# Patient Record
Sex: MALE | Employment: OTHER
[De-identification: names, ages, dates, MRNs, and addresses within clinical notes are randomized per-mention and may not be internally consistent; named-entity substitution may affect disease eponyms.]

---

## 2022-06-29 ENCOUNTER — OFFICE VISIT (OUTPATIENT)
Dept: FAMILY MEDICINE CLINIC | Facility: CLINIC | Age: 37
End: 2022-06-29
Payer: OTHER GOVERNMENT

## 2022-06-29 VITALS
HEART RATE: 61 BPM | DIASTOLIC BLOOD PRESSURE: 72 MMHG | HEIGHT: 72 IN | WEIGHT: 193 LBS | BODY MASS INDEX: 26.14 KG/M2 | SYSTOLIC BLOOD PRESSURE: 128 MMHG | TEMPERATURE: 98.5 F | OXYGEN SATURATION: 97 %

## 2022-06-29 DIAGNOSIS — A63.0 GENITAL WARTS: ICD-10-CM

## 2022-06-29 DIAGNOSIS — D17.23 LIPOMA OF RIGHT LOWER EXTREMITY: Primary | ICD-10-CM

## 2022-06-29 PROCEDURE — 99203 OFFICE O/P NEW LOW 30 MIN: CPT | Performed by: FAMILY MEDICINE

## 2022-06-29 RX ORDER — DOXYCYCLINE HYCLATE 100 MG
TABLET ORAL
COMMUNITY
Start: 2022-05-31 | End: 2022-07-20

## 2022-06-29 RX ORDER — BETAMETHASONE DIPROPIONATE 0.5 MG/G
CREAM TOPICAL
COMMUNITY
Start: 2022-06-28 | End: 2022-08-02

## 2022-06-29 RX ORDER — IMIQUIMOD 12.5 MG/.25G
CREAM TOPICAL
COMMUNITY
Start: 2022-05-31 | End: 2022-07-20

## 2022-06-29 ASSESSMENT — ENCOUNTER SYMPTOMS
RESPIRATORY NEGATIVE: 1
ALLERGIC/IMMUNOLOGIC NEGATIVE: 1
GASTROINTESTINAL NEGATIVE: 1
EYES NEGATIVE: 1

## 2022-06-29 ASSESSMENT — PATIENT HEALTH QUESTIONNAIRE - PHQ9
SUM OF ALL RESPONSES TO PHQ QUESTIONS 1-9: 0
SUM OF ALL RESPONSES TO PHQ9 QUESTIONS 1 & 2: 0
SUM OF ALL RESPONSES TO PHQ QUESTIONS 1-9: 0
2. FEELING DOWN, DEPRESSED OR HOPELESS: 0
SUM OF ALL RESPONSES TO PHQ QUESTIONS 1-9: 0
1. LITTLE INTEREST OR PLEASURE IN DOING THINGS: 0
SUM OF ALL RESPONSES TO PHQ QUESTIONS 1-9: 0

## 2022-06-29 NOTE — PROGRESS NOTES
Maritza Perez (: 1985) is a 40 y.o. male, new patient, here for evaluation of the following chief complaint(s): Other (test for std had mile on penis. went to the dermatologist and they did a biopsy. ) and Other (growth on the right thigh )       ASSESSMENT/PLAN:  1. Lipoma of right lower extremity  2. Genital warts    Establishing care,  Likely lipoma in the right lower extremity, declined surgical work-up or management at this time, small, 1 to 2 cm large,  History of genital warts, treated by dermatology, reassured patient, let him know there is no HPV testing for males in this country except for active tumors, wife should continue to have cervical cancer screenings as indicated,      Return in about 1 month (around 2022) for physical.    SUBJECTIVE/OBJECTIVE:  HPI  Establishing care, works as a  in [place], moved here recently, has 2 daughters,  No recent primary care, is [employer]     History of a genital wart, removed by dermatology, has steroid, betamethasone cream that was used on the wound created by the treatment for the wart, patient had concerns regarding HPV, would like to be tested, worried that he will get something to his wife. Growth underskin, mobile, nontender, not fluctuant, likely lipoma, has grown slightly right thigh,      PMH   has a past medical history of Traumatic brain injury ([facility] [address] Past Surgical History:   Procedure Laterality Date    INGUINAL HERNIA REPAIR      KIDNEY STONE REMOVAL      surgical removal of kidney stones and lithotripsy.  OTHER SURGICAL HISTORY      bladder surgery when he was young     OTHER SURGICAL HISTORY  2004    broke metacarpal on the right hand.   Had hardware that was placed, but later removed       Current Outpatient Medications on File Prior to Visit   Medication Sig Dispense Refill    betamethasone dipropionate 0.05 % cream       doxycycline hyclate (VIBRA-TABS) 100 MG tablet (Patient not taking: Reported on 6/29/2022)      imiquimod (ALDARA) 5 % cream  (Patient not taking: Reported on 6/29/2022)       No current facility-administered medications on file prior to visit. Social History     Socioeconomic History    Marital status: Unknown     Spouse name: Not on file    Number of children: Not on file    Years of education: Not on file    Highest education level: Not on file   Occupational History    Not on file   Tobacco Use    Smoking status: Current Some Day Smoker    Smokeless tobacco: Never Used    Tobacco comment: cigars 3 times a week    Vaping Use    Vaping Use: Never used   Substance and Sexual Activity    Alcohol use: Yes     Comment: one beer with dinner    Drug use: Never    Sexual activity: Yes     Partners: Female   Other Topics Concern    Not on file   Social History Narrative    Not on file     Social Determinants of Health     Financial Resource Strain:     Difficulty of Paying Living Expenses: Not on file   Food Insecurity:     Worried About Running Out of Food in the Last Year: Not on file    Rafael of Food in the Last Year: Not on file   Transportation Needs:     Lack of Transportation (Medical): Not on file    Lack of Transportation (Non-Medical):  Not on file   Physical Activity:     Days of Exercise per Week: Not on file    Minutes of Exercise per Session: Not on file   Stress:     Feeling of Stress : Not on file   Social Connections:     Frequency of Communication with Friends and Family: Not on file    Frequency of Social Gatherings with Friends and Family: Not on file    Attends [employer] Services: Not on file    Active Member of Clubs or Organizations: Not on file    Attends Club or Organization Meetings: Not on file    Marital Status: Not on file   Intimate Partner Violence:     Fear of Current or Ex-Partner: Not on file    Emotionally Abused: Not on file    Physically Abused: Not on file    Sexually Abused: Not on file Housing Stability:     Unable to Pay for Housing in the Last Year: Not on file    Number of Places Lived in the Last Year: Not on file    Unstable Housing in the Last Year: Not on file       Family History   Problem Relation Age of Onset    Cancer Mother         skin cancer    No Known Problems Father     No Known Problems Sister     No Known Problems Maternal Grandfather     Dementia Paternal Grandmother     Heart Attack Paternal Grandfather          ALLERGIES  No Known Allergies    PREVIOUS PRIMARY CARE PROVIDER  Yang Perrin MD      RECORDS    Provided by patient: None    Review of Systems   Constitutional: Negative. HENT: Negative. Eyes: Negative. Respiratory: Negative. Cardiovascular: Negative. Gastrointestinal: Negative. Endocrine: Negative. Genitourinary: Negative. Musculoskeletal: Negative. Skin: Negative. Allergic/Immunologic: Negative. Neurological: Negative. Hematological: Negative. Psychiatric/Behavioral: Negative. All other systems reviewed and are negative. Physical Exam  Constitutional:       General: He is not in acute distress. Appearance: He is not ill-appearing, toxic-appearing or diaphoretic. HENT:      Head: Normocephalic and atraumatic. Right Ear: External ear normal. There is no impacted cerumen. Left Ear: External ear normal. There is no impacted cerumen. Nose: Nose normal. No congestion or rhinorrhea. Mouth/Throat:      Mouth: Mucous membranes are moist.      Pharynx: No oropharyngeal exudate or posterior oropharyngeal erythema. Eyes:      General:         Right eye: No discharge. Left eye: No discharge. Extraocular Movements: Extraocular movements intact. Conjunctiva/sclera: Conjunctivae normal.      Pupils: Pupils are equal, round, and reactive to light. Cardiovascular:      Rate and Rhythm: Normal rate. Pulses: Normal pulses.    Pulmonary:      Effort: Pulmonary effort is normal. No respiratory distress. Abdominal:      General: Abdomen is flat. Palpations: Abdomen is soft. Musculoskeletal:      Cervical back: Normal range of motion and neck supple. No rigidity. Neurological:      General: No focal deficit present. Mental Status: He is alert and oriented to person, place, and time. Mental status is at baseline. Cranial Nerves: No cranial nerve deficit. Sensory: No sensory deficit. Motor: No weakness. Coordination: Coordination normal.      Gait: Gait normal.      Deep Tendon Reflexes: Reflexes normal.   Psychiatric:         Mood and Affect: Mood normal.         On this date 06/29/22  I have spent 30 minutes reviewing previous notes, test results and face to face with the patient discussing the diagnosis and importance of compliance with the treatment plan as well as documenting on the day of the visit. No LOS data to display        An electronic signature was used to authenticate this note.   -- Horacio Nicholson MD

## 2022-08-02 ENCOUNTER — OFFICE VISIT (OUTPATIENT)
Dept: FAMILY MEDICINE CLINIC | Facility: CLINIC | Age: 37
End: 2022-08-02
Payer: OTHER GOVERNMENT

## 2022-08-02 VITALS
DIASTOLIC BLOOD PRESSURE: 72 MMHG | SYSTOLIC BLOOD PRESSURE: 116 MMHG | BODY MASS INDEX: 26.55 KG/M2 | OXYGEN SATURATION: 98 % | HEIGHT: 72 IN | HEART RATE: 64 BPM | WEIGHT: 196 LBS | TEMPERATURE: 97.8 F

## 2022-08-02 DIAGNOSIS — D17.23 LIPOMA OF RIGHT LOWER EXTREMITY: Primary | ICD-10-CM

## 2022-08-02 DIAGNOSIS — Z00.00 WELL ADULT EXAM: ICD-10-CM

## 2022-08-02 LAB
BASOPHILS # BLD: 0.1 K/UL (ref 0–0.2)
BASOPHILS NFR BLD: 1 % (ref 0–2)
DIFFERENTIAL METHOD BLD: ABNORMAL
EOSINOPHIL # BLD: 0.3 K/UL (ref 0–0.8)
EOSINOPHIL NFR BLD: 5 % (ref 0.5–7.8)
ERYTHROCYTE [DISTWIDTH] IN BLOOD BY AUTOMATED COUNT: 13.1 % (ref 11.9–14.6)
HCT VFR BLD AUTO: 50.1 % (ref 41.1–50.3)
HGB BLD-MCNC: 16.5 G/DL (ref 13.6–17.2)
IMM GRANULOCYTES # BLD AUTO: 0 K/UL (ref 0–0.5)
IMM GRANULOCYTES NFR BLD AUTO: 0 % (ref 0–5)
LYMPHOCYTES # BLD: 1.8 K/UL (ref 0.5–4.6)
LYMPHOCYTES NFR BLD: 29 % (ref 13–44)
MCH RBC QN AUTO: 33.2 PG (ref 26.1–32.9)
MCHC RBC AUTO-ENTMCNC: 32.9 G/DL (ref 31.4–35)
MCV RBC AUTO: 100.8 FL (ref 79.6–97.8)
MONOCYTES # BLD: 0.5 K/UL (ref 0.1–1.3)
MONOCYTES NFR BLD: 8 % (ref 4–12)
NEUTS SEG # BLD: 3.5 K/UL (ref 1.7–8.2)
NEUTS SEG NFR BLD: 57 % (ref 43–78)
NRBC # BLD: 0 K/UL (ref 0–0.2)
PLATELET # BLD AUTO: 204 K/UL (ref 150–450)
PMV BLD AUTO: 10.8 FL (ref 9.4–12.3)
RBC # BLD AUTO: 4.97 M/UL (ref 4.23–5.6)
WBC # BLD AUTO: 6 K/UL (ref 4.3–11.1)

## 2022-08-02 PROCEDURE — 99395 PREV VISIT EST AGE 18-39: CPT | Performed by: FAMILY MEDICINE

## 2022-08-02 RX ORDER — SILDENAFIL CITRATE 20 MG/1
TABLET ORAL
COMMUNITY
Start: 2022-07-11 | End: 2022-08-02

## 2022-08-02 RX ORDER — IMIQUIMOD 12.5 MG/.25G
CREAM TOPICAL
COMMUNITY
Start: 2022-04-20 | End: 2022-08-02

## 2022-08-02 RX ORDER — CEPHALEXIN 500 MG/1
CAPSULE ORAL
COMMUNITY
Start: 2022-04-14 | End: 2022-08-02

## 2022-08-02 RX ORDER — DOXYCYCLINE HYCLATE 100 MG
TABLET ORAL
COMMUNITY
Start: 2022-05-31 | End: 2022-08-02

## 2022-08-02 ASSESSMENT — ENCOUNTER SYMPTOMS
GASTROINTESTINAL NEGATIVE: 1
RESPIRATORY NEGATIVE: 1
EYES NEGATIVE: 1
ALLERGIC/IMMUNOLOGIC NEGATIVE: 1

## 2022-08-02 ASSESSMENT — PATIENT HEALTH QUESTIONNAIRE - PHQ9
SUM OF ALL RESPONSES TO PHQ QUESTIONS 1-9: 0
SUM OF ALL RESPONSES TO PHQ QUESTIONS 1-9: 0
1. LITTLE INTEREST OR PLEASURE IN DOING THINGS: 0
SUM OF ALL RESPONSES TO PHQ QUESTIONS 1-9: 0
SUM OF ALL RESPONSES TO PHQ QUESTIONS 1-9: 0
2. FEELING DOWN, DEPRESSED OR HOPELESS: 0
SUM OF ALL RESPONSES TO PHQ9 QUESTIONS 1 & 2: 0

## 2022-08-02 NOTE — PROGRESS NOTES
HPI  Nohemi Arteaga is a 40 y.o. male who presents in the office today for wellness visit. Reviewed immunizations which patient is due for with patient. Medical problems discussed in addition to physical:  Drinks a beer 3 times a week, no smoking. Concerned regarding lipoma, likely lipoma on the right thigh, been told by 4 different providers that it was nothing, patient would like to see a surgeon, specialist to evaluate, remove it. Current Outpatient Medications on File Prior to Visit   Medication Sig Dispense Refill    cephALEXin (KEFLEX) 500 MG capsule  (Patient not taking: Reported on 8/2/2022)      doxycycline hyclate (VIBRA-TABS) 100 MG tablet  (Patient not taking: Reported on 8/2/2022)      imiquimod (ALDARA) 5 % cream  (Patient not taking: Reported on 8/2/2022)      sildenafil (REVATIO) 20 MG tablet Take 3 tablets by mouth one hour prior to sex as needed. (Patient not taking: Reported on 8/2/2022)      betamethasone dipropionate 0.05 % cream  (Patient not taking: Reported on 8/2/2022)       No current facility-administered medications on file prior to visit. Past Medical History:   Diagnosis Date    Traumatic brain injury ([address]        No Known Allergies    Past Surgical History:   Procedure Laterality Date    INGUINAL HERNIA REPAIR  2016    KIDNEY STONE REMOVAL  2016    surgical removal of kidney stones and lithotripsy. OTHER SURGICAL HISTORY      bladder surgery when he was young     OTHER SURGICAL HISTORY  06/2004    broke metacarpal on the right hand.   Had hardware that was placed, but later removed       Social History     Socioeconomic History    Marital status: Unknown     Spouse name: Not on file    Number of children: Not on file    Years of education: Not on file    Highest education level: Not on file   Occupational History    Not on file   Tobacco Use    Smoking status: Some Days    Smokeless tobacco: Never    Tobacco comments:     cigars 3 times a week    Vaping Use Vaping Use: Never used   Substance and Sexual Activity    Alcohol use: Yes     Comment: one beer with dinner    Drug use: Never    Sexual activity: Yes     Partners: Female   Other Topics Concern    Not on file   Social History Narrative    Not on file     Social Determinants of Health     Financial Resource Strain: Not on file   Food Insecurity: Not on file   Transportation Needs: Not on file   Physical Activity: Not on file   Stress: Not on file   Social Connections: Not on file   Intimate Partner Violence: Not on file   Housing Stability: Not on file             Review of Systems:  Review of Systems   Constitutional: Negative. HENT: Negative. Eyes: Negative. Respiratory: Negative. Cardiovascular: Negative. Gastrointestinal: Negative. Endocrine: Negative. Genitourinary: Negative. Musculoskeletal: Negative. Skin: Negative. Allergic/Immunologic: Negative. Neurological: Negative. Hematological: Negative. Psychiatric/Behavioral: Negative. All other systems reviewed and are negative. PHYSICAL EXAM   /72 (Site: Left Upper Arm, Position: Sitting, Cuff Size: Medium Adult)   Pulse 64   Temp 97.8 °F (36.6 °C) (Oral)   Ht 5' 11.5\" (1.816 m)   Wt 196 lb (88.9 kg)   SpO2 98%   BMI 26.96 kg/m²        Physical Exam   Physical Exam  Constitutional:       General: He is not in acute distress. Appearance: He is not ill-appearing, toxic-appearing or diaphoretic. HENT:      Head: Normocephalic and atraumatic. Right Ear: External ear normal. There is no impacted cerumen. Left Ear: External ear normal. There is no impacted cerumen. Nose: Nose normal. No congestion or rhinorrhea. Mouth/Throat:      Mouth: Mucous membranes are moist.      Pharynx: No oropharyngeal exudate or posterior oropharyngeal erythema. Eyes:      General:         Right eye: No discharge. Left eye: No discharge.       Extraocular Movements: Extraocular movements intact. Conjunctiva/sclera: Conjunctivae normal.      Pupils: Pupils are equal, round, and reactive to light. Cardiovascular:      Rate and Rhythm: Normal rate. Pulses: Normal pulses. Pulmonary:      Effort: Pulmonary effort is normal. No respiratory distress. Abdominal:      General: Abdomen is flat. Palpations: Abdomen is soft. Musculoskeletal:      Cervical back: Normal range of motion and neck supple. No rigidity. Neurological:      General: No focal deficit present. Mental Status: He is alert and oriented to person, place, and time. Mental status is at baseline. Cranial Nerves: No cranial nerve deficit. Sensory: No sensory deficit. Motor: No weakness. Coordination: Coordination normal.      Gait: Gait normal.      Deep Tendon Reflexes: Reflexes normal.   Psychiatric:         Mood and Affect: Mood normal.           No results found for this visit on 08/02/22. DIAGNOSIS   Diagnosis Orders   1. Lipoma of right lower extremity  TSH    CBC with Auto Differential    Comprehensive Metabolic Panel    Lipid Panel    Vitamin D 25 Hydroxy    HIV 1/2 Ag/Ab, 4TH Generation,W Rflx Confirm    Hepatitis C Antibody    [facility]    Hepatitis C Antibody    HIV 1/2 Ag/Ab, 4TH Generation,W Rflx Confirm    Vitamin D 25 Hydroxy    Lipid Panel    Comprehensive Metabolic Panel    CBC with Auto Differential    TSH      2.  Well adult exam  TSH    CBC with Auto Differential    Comprehensive Metabolic Panel    Lipid Panel    Vitamin D 25 Hydroxy    HIV 1/2 Ag/Ab, 4TH Generation,W Rflx Confirm    Hepatitis C Antibody    [facility]    Hepatitis C Antibody    HIV 1/2 Ag/Ab, 4TH Generation,W Rflx Confirm    Vitamin D 25 Hydroxy    Lipid Panel    Comprehensive Metabolic Panel    CBC with Auto Differential    TSH            ASSESSMENT AND PLAN    he was encouraged to sign up/go to [employer] for access to information at a later time. Continue diet, exercise, meds as noted. Likely lipoma right thigh, no growth, will refer to general surgery for evaluation, opinion  Check labs today,  Return for physical in 1 year    MDM          Routine Health and Prevention:   Medications were reconciled at today's visit and his health maintenance items were reviewed & updated as well. Recommended exercise and balanced diet per [facility] recommendations.      Orders Placed This Encounter   Procedures    TSH     Standing Status:   Future     Number of Occurrences:   1     Standing Expiration Date:   8/2/2023    CBC with Auto Differential     Standing Status:   Future     Number of Occurrences:   1     Standing Expiration Date:   8/2/2023    Comprehensive Metabolic Panel     Standing Status:   Future     Number of Occurrences:   1     Standing Expiration Date:   8/2/2023    Lipid Panel     Standing Status:   Future     Number of Occurrences:   1     Standing Expiration Date:   8/2/2023    Vitamin D 25 Hydroxy     Standing Status:   Future     Number of Occurrences:   1     Standing Expiration Date:   8/2/2023    HIV 1/2 Ag/Ab, 4TH Generation,W Rflx Confirm     Standing Status:   Future     Number of Occurrences:   1     Standing Expiration Date:   8/2/2023    Hepatitis C Antibody     Standing Status:   Future     Number of Occurrences:   1     Standing Expiration Date:   8/2/2023    [facility] [address] [facility], [facility]     Referral Priority:   Routine     Referral Type:   Eval and Treat     Referral Reason:   Specialty Services Required     Requested Specialty:   General Surgery     Number of Visits Requested:   1          Luma Laurent MD

## 2022-08-03 ENCOUNTER — TELEPHONE (OUTPATIENT)
Dept: FAMILY MEDICINE CLINIC | Facility: CLINIC | Age: 37
End: 2022-08-03

## 2022-08-03 LAB
25(OH)D3 SERPL-MCNC: 34 NG/ML (ref 30–100)
ALBUMIN SERPL-MCNC: 4.2 G/DL (ref 3.5–5)
ALBUMIN/GLOB SERPL: 1.4 {RATIO} (ref 1.2–3.5)
ALP SERPL-CCNC: 55 U/L (ref 50–136)
ALT SERPL-CCNC: 33 U/L (ref 12–65)
ANION GAP SERPL CALC-SCNC: 7 MMOL/L (ref 7–16)
AST SERPL-CCNC: 18 U/L (ref 15–37)
BILIRUB SERPL-MCNC: 0.4 MG/DL (ref 0.2–1.1)
BUN SERPL-MCNC: 16 MG/DL (ref 6–23)
CALCIUM SERPL-MCNC: 9.2 MG/DL (ref 8.3–10.4)
CHLORIDE SERPL-SCNC: 110 MMOL/L (ref 98–107)
CHOLEST SERPL-MCNC: 161 MG/DL
CO2 SERPL-SCNC: 24 MMOL/L (ref 21–32)
CREAT SERPL-MCNC: 1.2 MG/DL (ref 0.8–1.5)
GLOBULIN SER CALC-MCNC: 2.9 G/DL (ref 2.3–3.5)
GLUCOSE SERPL-MCNC: 91 MG/DL (ref 65–100)
HCV AB SER QL: NONREACTIVE
HDLC SERPL-MCNC: 57 MG/DL (ref 40–60)
HDLC SERPL: 2.8 {RATIO}
HIV 1+2 AB+HIV1 P24 AG SERPL QL IA: NONREACTIVE
HIV 1/2 RESULT COMMENT: NORMAL
LDLC SERPL CALC-MCNC: 88.2 MG/DL
POTASSIUM SERPL-SCNC: 4.6 MMOL/L (ref 3.5–5.1)
PROT SERPL-MCNC: 7.1 G/DL (ref 6.3–8.2)
SODIUM SERPL-SCNC: 141 MMOL/L (ref 136–145)
TRIGL SERPL-MCNC: 79 MG/DL (ref 35–150)
TSH, 3RD GENERATION: 0.75 UIU/ML (ref 0.36–3.74)
VLDLC SERPL CALC-MCNC: 15.8 MG/DL (ref 6–23)

## 2022-10-27 ENCOUNTER — OFFICE VISIT (OUTPATIENT)
Dept: SURGERY | Age: 37
End: 2022-10-27
Payer: OTHER GOVERNMENT

## 2022-10-27 VITALS
DIASTOLIC BLOOD PRESSURE: 70 MMHG | WEIGHT: 193.6 LBS | HEART RATE: 67 BPM | SYSTOLIC BLOOD PRESSURE: 120 MMHG | HEIGHT: 71 IN | OXYGEN SATURATION: 97 % | BODY MASS INDEX: 27.1 KG/M2

## 2022-10-27 DIAGNOSIS — R22.41 SUBCUTANEOUS MASS OF RIGHT LOWER EXTREMITY: ICD-10-CM

## 2022-10-27 PROCEDURE — 99202 OFFICE O/P NEW SF 15 MIN: CPT | Performed by: SURGERY

## 2022-10-27 NOTE — PROGRESS NOTES
[facility]  [address] [place] [address]  [phone]    Office Note/H&P/Consult Note   Sandra Valles   MRN: [id number]     : 1985        HPI: Sandra Valles is a 40 y.o. male who is here to see me today with a subcutaneous nodule of his right thigh. Is been present for quite some time. At times it enlarges especially when he is working out. At times it can be uncomfortable. He would like to have it evaluated and would like to have it removed. Past Medical History:   Diagnosis Date    Traumatic brain injury      Past Surgical History:   Procedure Laterality Date    INGUINAL HERNIA REPAIR  2016    KIDNEY STONE REMOVAL  2016    surgical removal of kidney stones and lithotripsy. OTHER SURGICAL HISTORY      bladder surgery when he was young     OTHER SURGICAL HISTORY  2004    broke metacarpal on the right hand. Had hardware that was placed, but later removed     No current outpatient medications on file. No current facility-administered medications for this visit. ALLERGIES:  Patient has no known allergies.     Social History     Socioeconomic History    Marital status: Unknown     Spouse name: None    Number of children: None    Years of education: None    Highest education level: None   Tobacco Use    Smoking status: Some Days    Smokeless tobacco: Never    Tobacco comments:     cigars 3 times a week    Vaping Use    Vaping Use: Never used   Substance and Sexual Activity    Alcohol use: Yes     Comment: one beer with dinner    Drug use: Never    Sexual activity: Yes     Partners: Female     Social History     Tobacco Use   Smoking Status Some Days   Smokeless Tobacco Never   Tobacco Comments    cigars 3 times a week      Family History   Problem Relation Age of Onset    Cancer Mother         skin cancer    No Known Problems Father     No Known Problems Sister     No Known Problems Maternal Grandfather     Dementia Paternal Grandmother Heart Attack Paternal Grandfather        ROS: The patient has no difficulty with chest pain or shortness of breath. No fever or chills. Comprehensive review of systems was otherwise unremarkable except as noted above. Physical Exam:   Constitutional: Alert oriented cooperative patient in no acute distress. /70   Pulse 67   Ht 5' 11\" (1.803 m)   Wt 193 lb 9.6 oz (87.8 kg)   SpO2 97%   BMI 27.00 kg/m²   Eyes:Sclera are clear, pupils symmetric   ENMT: ears have no obvious external lesions; no obvious neck masses; no lip lesions  CV: RRR. Normal perfusion; no embolic signs  Resp: No JVD. Breathing is  non-labored. No audible wheezing   GI: soft and non-distended     Musculoskeletal: no significant asymmetry; normal tone; unremarkable with normal function. 1 x 1 cm subcutaneous nodule of the right thigh. Neuro:  No obvious focal deficits; moves all 4; A&O x3  Psychiatric: normal affect and mood, no memory impairment      Labs:  Lab Results   Component Value Date/Time    WBC 6.0 08/02/2022 08:35 AM    HGB 16.5 08/02/2022 08:35 AM     08/02/2022 08:35 AM     08/02/2022 08:35 AM    K 4.6 08/02/2022 08:35 AM     08/02/2022 08:35 AM    CO2 24 08/02/2022 08:35 AM    BUN 16 08/02/2022 08:35 AM    ALT 33 08/02/2022 08:35 AM       No results found for this or any previous visit. I reviewed patient's medications, labs, and independently reviewed relevant radiologic studies if available. Labs/radiology studies as ordered in [facility] care or in scanned in media tab if pt is pre-op. Amt of data reviewed moderate-extensive. I spent 15 minutes with him today. Greater than 50 so this time was spent in counseling. Assessment/Plan:     )Kristy Lawler is a 40 y.o. male who has signs and symptoms consistent with the below issues:  Right thigh subcutaneous mass. We can easily remove this in the office. He will need approval from [employer].   We will make an appointment to see him in the next week or so to have this removed in the office if his insurance approves.           Waldo oMore MD,  FACS

## 2024-06-21 ENCOUNTER — APPOINTMENT (OUTPATIENT)
Dept: CT IMAGING | Age: 39
End: 2024-06-21
Payer: OTHER MISCELLANEOUS

## 2024-06-21 ENCOUNTER — APPOINTMENT (OUTPATIENT)
Dept: GENERAL RADIOLOGY | Age: 39
End: 2024-06-21
Payer: OTHER MISCELLANEOUS

## 2024-06-21 ENCOUNTER — HOSPITAL ENCOUNTER (EMERGENCY)
Age: 39
Discharge: HOME | End: 2024-06-22
Attending: EMERGENCY MEDICINE
Payer: OTHER MISCELLANEOUS

## 2024-06-21 VITALS
BODY MASS INDEX: 26.41 KG/M2 | OXYGEN SATURATION: 99 % | DIASTOLIC BLOOD PRESSURE: 80 MMHG | HEART RATE: 73 BPM | TEMPERATURE: 97.6 F | SYSTOLIC BLOOD PRESSURE: 121 MMHG | HEIGHT: 72 IN | RESPIRATION RATE: 15 BRPM | WEIGHT: 195 LBS
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
## 2024-06-21 DIAGNOSIS — S39.012A STRAIN OF LUMBAR REGION, INITIAL ENCOUNTER: ICD-10-CM

## 2024-06-21 LAB
BILIRUB UR QL: ABNORMAL
GLUCOSE UR QL STRIP.AUTO: NEGATIVE MG/DL
KETONES UR-MCNC: NEGATIVE MG/DL
LEUKOCYTE ESTERASE UR QL STRIP: NEGATIVE
NITRITE UR QL: NEGATIVE
PH UR: 5.5 (ref 5–9)
PROT UR QL: NEGATIVE MG/DL
RBC # UR STRIP: NEGATIVE
SERVICE CMNT-IMP: ABNORMAL
SP GR UR: >1.03 (ref 1–1.02)
UROBILINOGEN UR QL: 0.2 EU/DL (ref 0.2–1)

## 2024-06-21 PROCEDURE — 81003 URINALYSIS AUTO W/O SCOPE: CPT

## 2024-06-21 PROCEDURE — 72100 X-RAY EXAM L-S SPINE 2/3 VWS: CPT

## 2024-06-21 PROCEDURE — 72131 CT LUMBAR SPINE W/O DYE: CPT

## 2024-06-21 PROCEDURE — 99284 EMERGENCY DEPT VISIT MOD MDM: CPT

## 2024-06-21 ASSESSMENT — PAIN - FUNCTIONAL ASSESSMENT: PAIN_FUNCTIONAL_ASSESSMENT: 0-10

## 2024-06-21 ASSESSMENT — ENCOUNTER SYMPTOMS: BACK PAIN: 1

## 2024-06-21 ASSESSMENT — PAIN SCALES - GENERAL: PAINLEVEL_OUTOF10: 7

## 2024-06-22 RX ORDER — LIDOCAINE 50 MG/G
1 PATCH TOPICAL DAILY
Qty: 10 PATCH | Refills: 0 | Status: SHIPPED | OUTPATIENT
Start: 2024-06-22 | End: 2024-07-02

## 2024-06-22 RX ORDER — CYCLOBENZAPRINE HCL 5 MG
5 TABLET ORAL NIGHTLY PRN
Qty: 10 TABLET | Refills: 0 | Status: SHIPPED | OUTPATIENT
Start: 2024-06-22 | End: 2024-07-02

## 2024-06-22 ASSESSMENT — LIFESTYLE VARIABLES
HOW MANY STANDARD DRINKS CONTAINING ALCOHOL DO YOU HAVE ON A TYPICAL DAY: PATIENT DECLINED
HOW OFTEN DO YOU HAVE A DRINK CONTAINING ALCOHOL: PATIENT DECLINED

## 2024-06-22 NOTE — DISCHARGE INSTRUCTIONS
Use the Voltaren gel as prescribed over the affected area    Do the stretching exercises like we discussed.    Drink 1 to 2 L of water daily.    Take the Flexeril at nighttime before going to bed as needed.      Use the Lidoderm patch over the affected area 12 hours on and a minimum of 12 hours off before putting a new patch on your body
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
